# Patient Record
Sex: MALE | Race: OTHER
[De-identification: names, ages, dates, MRNs, and addresses within clinical notes are randomized per-mention and may not be internally consistent; named-entity substitution may affect disease eponyms.]

---

## 2018-11-05 ENCOUNTER — HOSPITAL ENCOUNTER (EMERGENCY)
Dept: HOSPITAL 62 - ER | Age: 33
LOS: 1 days | Discharge: ANOTHER HOSPITAL | End: 2018-11-06
Payer: COMMERCIAL

## 2018-11-05 DIAGNOSIS — R06.02: ICD-10-CM

## 2018-11-05 DIAGNOSIS — R07.9: Primary | ICD-10-CM

## 2018-11-05 LAB
ADD MANUAL DIFF: NO
ALBUMIN SERPL-MCNC: 4.2 G/DL (ref 3.5–5)
ALP SERPL-CCNC: 98 U/L (ref 38–126)
ALT SERPL-CCNC: 51 U/L (ref 21–72)
ANION GAP SERPL CALC-SCNC: 10 MMOL/L (ref 5–19)
APPEARANCE UR: CLEAR
APTT PPP: YELLOW S
AST SERPL-CCNC: 60 U/L (ref 17–59)
BARBITURATES UR QL SCN: NEGATIVE
BASOPHILS # BLD AUTO: 0 10^3/UL (ref 0–0.2)
BASOPHILS NFR BLD AUTO: 0.9 % (ref 0–2)
BILIRUB DIRECT SERPL-MCNC: 0.4 MG/DL (ref 0–0.4)
BILIRUB SERPL-MCNC: 0.8 MG/DL (ref 0.2–1.3)
BILIRUB UR QL STRIP: NEGATIVE
BUN SERPL-MCNC: 27 MG/DL (ref 7–20)
CALCIUM: 9.6 MG/DL (ref 8.4–10.2)
CHLORIDE SERPL-SCNC: 101 MMOL/L (ref 98–107)
CK MB SERPL-MCNC: 2.5 NG/ML (ref ?–4.55)
CK SERPL-CCNC: 193 U/L (ref 55–170)
CO2 SERPL-SCNC: 32 MMOL/L (ref 22–30)
EOSINOPHIL # BLD AUTO: 0.1 10^3/UL (ref 0–0.6)
EOSINOPHIL NFR BLD AUTO: 1.9 % (ref 0–6)
ERYTHROCYTE [DISTWIDTH] IN BLOOD BY AUTOMATED COUNT: 12.6 % (ref 11.5–14)
GLUCOSE SERPL-MCNC: 101 MG/DL (ref 75–110)
GLUCOSE UR STRIP-MCNC: NEGATIVE MG/DL
HCT VFR BLD CALC: 45.5 % (ref 37.9–51)
HGB BLD-MCNC: 15.9 G/DL (ref 13.5–17)
KETONES UR STRIP-MCNC: (no result) MG/DL
LYMPHOCYTES # BLD AUTO: 2.2 10^3/UL (ref 0.5–4.7)
LYMPHOCYTES NFR BLD AUTO: 39.9 % (ref 13–45)
MCH RBC QN AUTO: 29 PG (ref 27–33.4)
MCHC RBC AUTO-ENTMCNC: 34.9 G/DL (ref 32–36)
MCV RBC AUTO: 83 FL (ref 80–97)
METHADONE UR QL SCN: NEGATIVE
MONOCYTES # BLD AUTO: 0.7 10^3/UL (ref 0.1–1.4)
MONOCYTES NFR BLD AUTO: 11.7 % (ref 3–13)
NEUTROPHILS # BLD AUTO: 2.6 10^3/UL (ref 1.7–8.2)
NEUTS SEG NFR BLD AUTO: 45.6 % (ref 42–78)
NITRITE UR QL STRIP: NEGATIVE
PCP UR QL SCN: NEGATIVE
PH UR STRIP: 6 [PH] (ref 5–9)
PLATELET # BLD: 209 10^3/UL (ref 150–450)
POTASSIUM SERPL-SCNC: 4.4 MMOL/L (ref 3.6–5)
PROT SERPL-MCNC: 7.7 G/DL (ref 6.3–8.2)
PROT UR STRIP-MCNC: NEGATIVE MG/DL
RBC # BLD AUTO: 5.48 10^6/UL (ref 4.35–5.55)
SODIUM SERPL-SCNC: 142.6 MMOL/L (ref 137–145)
SP GR UR STRIP: 1.03
TOTAL CELLS COUNTED % (AUTO): 100 %
TROPONIN I SERPL-MCNC: 0.05 NG/ML
URINE AMPHETAMINES SCREEN: NEGATIVE
URINE BENZODIAZEPINES SCREEN: NEGATIVE
URINE COCAINE SCREEN: NEGATIVE
URINE MARIJUANA (THC) SCREEN: NEGATIVE
UROBILINOGEN UR-MCNC: 2 MG/DL (ref ?–2)
WBC # BLD AUTO: 5.6 10^3/UL (ref 4–10.5)

## 2018-11-05 PROCEDURE — 80053 COMPREHEN METABOLIC PANEL: CPT

## 2018-11-05 PROCEDURE — 84484 ASSAY OF TROPONIN QUANT: CPT

## 2018-11-05 PROCEDURE — 82553 CREATINE MB FRACTION: CPT

## 2018-11-05 PROCEDURE — 71045 X-RAY EXAM CHEST 1 VIEW: CPT

## 2018-11-05 PROCEDURE — 81001 URINALYSIS AUTO W/SCOPE: CPT

## 2018-11-05 PROCEDURE — 71275 CT ANGIOGRAPHY CHEST: CPT

## 2018-11-05 PROCEDURE — 99291 CRITICAL CARE FIRST HOUR: CPT

## 2018-11-05 PROCEDURE — 93010 ELECTROCARDIOGRAM REPORT: CPT

## 2018-11-05 PROCEDURE — 96360 HYDRATION IV INFUSION INIT: CPT

## 2018-11-05 PROCEDURE — 85379 FIBRIN DEGRADATION QUANT: CPT

## 2018-11-05 PROCEDURE — 80307 DRUG TEST PRSMV CHEM ANLYZR: CPT

## 2018-11-05 PROCEDURE — 85025 COMPLETE CBC W/AUTO DIFF WBC: CPT

## 2018-11-05 PROCEDURE — 82550 ASSAY OF CK (CPK): CPT

## 2018-11-05 PROCEDURE — 36415 COLL VENOUS BLD VENIPUNCTURE: CPT

## 2018-11-05 PROCEDURE — 93005 ELECTROCARDIOGRAM TRACING: CPT

## 2018-11-05 NOTE — RADIOLOGY REPORT (SQ)
EXAM DESCRIPTION:  CHEST SINGLE VIEW



COMPLETED DATE/TIME:  11/5/2018 6:22 pm



REASON FOR STUDY:  chest pain



COMPARISON:  None.



EXAM PARAMETERS:  NUMBER OF VIEWS: One view.

TECHNIQUE: Single frontal radiographic view of the chest acquired.

RADIATION DOSE: NA

LIMITATIONS: None.



FINDINGS:  LUNGS AND PLEURA: No opacities, masses or pneumothorax. No pleural effusion.

MEDIASTINUM AND HILAR STRUCTURES: No masses.  Contour normal.

HEART AND VASCULAR STRUCTURES: Heart normal in size.  Normal vasculature.

BONES: No acute findings.

HARDWARE: None in the chest.

OTHER: No other significant finding.



IMPRESSION:  NO ACUTE RADIOGRAPHIC FINDING IN THE CHEST.



TECHNICAL DOCUMENTATION:  JOB ID:  [id number]

 2011 [employer]- All Rights Reserved



Reading location - IP/workstation name: [email or web address]

## 2018-11-05 NOTE — RADIOLOGY REPORT (SQ)
CT CHEST ANGIOGRAPHY WITHOUT THEN WITH IV CONTRAST



HISTORY: Shortness of breath. Evaluate for pulmonary embolism.



COMPARISON: None.



TECHNIQUE: CT angiogram of the chest with IV contrast. 3-D MIP

images were obtained in coronal and sagittal reconstructions.

This exam was performed according to our departmental

dose-optimization program, which includes automated exposure

control, adjustment of the mA and/or kV according to patient size

and/or use of iterative reconstruction technique. 



FINDINGS:



No acute pulmonary embolism is seen.

No thoracic aortic aneurysm or dissection.

Normal heart size. No pericardial effusion.

Thyroid gland is unremarkable.

No mediastinal, hilar, or axillary adenopathy is seen. 

No consolidation, pleural effusion, or pneumothorax is

identified. 

Osseous structures are intact.

Visualized upper abdomen is unremarkable.



IMPRESSION:



No acute pulmonary embolism.

## 2018-11-05 NOTE — EKG REPORT
SEVERITY:- ABNORMAL ECG -

SINUS RHYTHM

BORDERLINE T ABNORMALITIES, ANT-LAT LEADS

:

Confirmed by: Xander Carrasco MD 05-Nov-2018 18:40:15

## 2018-11-05 NOTE — ER DOCUMENT REPORT
ED Medical Screen (RME)





- General


Chief Complaint: Chest Pain


Stated Complaint: CHEST PAIN


Time Seen by Provider: 11/05/18 17:51


Mode of Arrival: Ambulatory


Information source: Patient


Notes: 





33-year-old male presents emergency department with complaints of left-sided 

chest pain shortness of breath that started at 5 PM yesterday.  Patient states 

that the symptoms have been constant in nature.  He describes the pain as a 

pressure/aching sensation in the left chest.  He denies any radiation.  He 

denies any alleviating or exacerbating factors.  Patient states that 

intermittently he will have palpitations.  Patient denies any medical problems.

  Is not on any medications.  Patient denies a family history of coronary 

artery disease.  Patient denies any recent travel, recent surgeries, calf pain, 

calf swelling, hormone use, history of DVT or PE.





I have greeted and performed a rapid initial assessment of this patient.  A 

comprehensive ED assessment and evaluation of the patient, analysis of test 

results and completion of the medical decision making process will be conducted 

by additional ED providers.





PHYSICAL EXAMINATION:





GENERAL: Well-appearing, well-nourished and in no acute distress.





HEAD: Atraumatic, normocephalic.





EYES: Pupils equal round extraocular movements intact,  conjunctiva are normal.





ENT: Nares patent





NECK: Normal range of motion





LUNGS: No respiratory distress.  Left anterior chest wall tenderness to 

palpation.





Musculoskeletal: Normal range of motion





NEUROLOGICAL:  Normal speech, normal gait. 





PSYCH: Normal mood, normal affect.





SKIN: Warm, Dry, normal turgor, no rashes or lesions noted.


TRAVEL OUTSIDE OF THE [place] IN LAST 30 DAYS: No





- Related Data


Allergies/Adverse Reactions: 


 





No Known Allergies Allergy (Unverified 04/09/15 20:18)

## 2018-11-06 VITALS — SYSTOLIC BLOOD PRESSURE: 130 MMHG | DIASTOLIC BLOOD PRESSURE: 65 MMHG

## 2018-11-06 NOTE — ER DOCUMENT REPORT
ED General





- General


Chief Complaint: Chest Pain


Stated Complaint: CHEST PAIN


Time Seen by Provider: 11/05/18 17:51


Mode of Arrival: Ambulatory


TRAVEL OUTSIDE OF THE [place] IN LAST 30 DAYS: No





- HPI


Patient complains to provider of: Chest pain


Notes: 

















Patient coming in for evaluation of chest pain.  Patient was seen in the triage 

area by the provider notes provided below





33-year-old male presents emergency department with complaints of left-sided 

chest pain shortness of breath that started at 5 PM yesterday.  Patient states 

that the symptoms have been constant in nature.  He describes the pain as a 

pressure/aching sensation in the left chest.  He denies any radiation.  He 

denies any alleviating or exacerbating factors.  Patient states that 

intermittently he will have palpitations.  Patient denies any medical problems.

  Is not on any medications.  Patient denies a family history of coronary 

artery disease.  Patient denies any recent travel, recent surgeries, calf pain, 

calf swelling, hormone use, history of DVT or PE.





Patient does agree with the above stents.  Patient denies any new pre-workout 

activity denies any drug abuse.  Patient states pain spontaneously resolved.  

Patient's wife is a nurse here at the hospital states taking vital signs 

throughout the night patient with a normal resting heart rate of 50 however 

while in pain 95 ambulating patient's heart rate and shortness of breath with 

increase heart rates in the 120s.  Patient otherwise at this time is chest pain-

free.  Patient does have a past medical history of premature birth at 26 weeks





- Related Data


Allergies/Adverse Reactions: 


 





No Known Allergies Allergy (Verified 11/05/18 23:03)


 











Past Medical History





- General


Information source: Patient





- Social History


Smoking Status: Never Smoker


Frequency of alcohol use: Occasional


Drug Abuse: None


Family History: Reviewed & Not Pertinent


Patient has suicidal ideation: No


Patient has homicidal ideation: No


Renal/ Medical History: Denies: Hx Peritoneal Dialysis


Past Surgical History: Reports: Hx Abdominal Surgery - hernia repair





Review of Systems





- Review of Systems


Constitutional: No symptoms reported


EENT: No symptoms reported


Cardiovascular: Chest pain


Respiratory: No symptoms reported


Gastrointestinal: No symptoms reported


Genitourinary: No symptoms reported


Male Genitourinary: No symptoms reported


Musculoskeletal: No symptoms reported


Skin: No symptoms reported


Hematologic/Lymphatic: No symptoms reported


Neurological/Psychological: No symptoms reported


-: Yes All other systems reviewed and negative





Physical Exam





- Vital signs


Vitals: 


 











Temp Pulse Resp BP Pulse Ox


 


 97.8 F   79   18   133/73 H  96 


 


 11/05/18 16:58  11/05/18 16:58  11/05/18 16:58  11/05/18 16:58  11/05/18 16:58











Interpretation: Normal





- General


General appearance: Appears well, Alert





- HEENT


Head: Normocephalic, Atraumatic


Eyes: Normal


Pupils: PERRL





- Respiratory


Respiratory status: No respiratory distress


Chest status: Nontender


Breath sounds: Normal


Chest palpation: Normal





- Cardiovascular


Rhythm: Regular


Heart sounds: Normal auscultation


Murmur: No





- Abdominal


Inspection: Normal


Distension: No distension


Bowel sounds: Normal


Tenderness: Nontender


Organomegaly: No organomegaly





- Back


Back: Normal, Nontender





- Extremities


General upper extremity: Normal inspection, Nontender, Normal color, Normal ROM

, Normal temperature


General lower extremity: Normal inspection, Nontender, Normal color, Normal ROM

, Normal temperature, Normal weight bearing.  No: Raleigh's sign





- Neurological


Neuro grossly intact: Yes


Cognition: Normal


Orientation: AAOx4


Nga Coma Scale Eye Opening: Spontaneous


Nga Coma Scale Verbal: Oriented


Nga Coma Scale Motor: Obeys Commands


Nga Coma Scale Total: 15


Speech: Normal


Motor strength normal: LUE, RUE, LLE, RLE


Sensory: Normal





- Psychological


Associated symptoms: Normal affect, Normal mood





- Skin


Skin Temperature: Warm


Skin Moisture: Dry


Skin Color: Normal





Course





- Re-evaluation


Re-evalutation: 





11/06/18 00:47


Patient EKG showed biphasic T waves V1 V2 V3.  Patient has no EKGs that we can 

compare to.  Patient remained chest pain-free.  Initial troponin 0 0.04 d-dimer 

slightly elevated because of the patient's symptoms and went ahead and perform 

a CTA that was otherwise negative.  Repeat EKG troponin while this is pending 

did discuss with the cardiologist on call to read EKG is Dr. Carrasco.  Unsure if 

this is a true Wellens syndrome on the patient's EKG recommended consult with 

tertiary care facility.  Discussed the case with the cardiology fellow at 

[facility] Dr Figueroa who accepted the patient on behalf of Dr. Ospina patient 

will be accepted through transfer to the cardiology 4.  Updated the patient 

they agree with this plan





- Vital Signs


Vital signs: 


 











Temp Pulse Resp BP Pulse Ox


 


 97.9 F   79   15   113/58 L  95 


 


 11/05/18 23:13  11/05/18 16:58  11/06/18 00:00  11/05/18 22:00  11/06/18 00:00














- Laboratory


Result Diagrams: 


 11/05/18 18:25





 11/05/18 18:25


Laboratory results interpreted by me: 


 











  11/05/18 11/05/18





  18:25 18:25


 


Carbon Dioxide  32 H 


 


BUN  27 H 


 


AST  60 H 


 


Creatine Kinase  193 H 


 


Urine Ketones   TRACE H


 


Urine Urobilinogen   2.0 H














Critical Care Note





- Critical Care Note


Total time excluding time spent on procedures (mins): 35


Comments: 





Patient with abnormal EKG time spent multiple phone calls to our cardiology 

team and transferring to tertiary care cardiology team.





Discharge





- Discharge


Clinical Impression: 


 Chest pain





Condition: Good


Disposition: [facility]


Referrals: 


RODOLFO BRIGHT MD [Primary Care Provider] - Follow up as needed

## 2018-11-06 NOTE — EKG REPORT
SEVERITY:- ABNORMAL ECG -

SINUS RHYTHM

BORDERLINE INFERIOR Q WAVES

PROLONGED QT INTERVAL

NO CHANGE IN NONSPECIFIC ST-T CHANGES IN ANTERIOR LEADS.

:

Confirmed by: Xander Carrasco MD 06-Nov-2018 07:41:24